# Patient Record
Sex: FEMALE
[De-identification: names, ages, dates, MRNs, and addresses within clinical notes are randomized per-mention and may not be internally consistent; named-entity substitution may affect disease eponyms.]

---

## 2018-12-03 ENCOUNTER — HOSPITAL ENCOUNTER (OUTPATIENT)
Dept: HOSPITAL 92 - BICULT | Age: 38
Discharge: HOME | End: 2018-12-03
Attending: OBSTETRICS & GYNECOLOGY
Payer: COMMERCIAL

## 2018-12-03 DIAGNOSIS — N63.11: Primary | ICD-10-CM

## 2018-12-03 NOTE — ULT
LIMITED RIGHT BREAST ULTRASOUND:

 

Date: 12-3-18

 

Provided Clinical History: 

Right breast mass. 

 

FINDINGS: 

Limited sonographic interrogation was performed of the right breast in the region of mammographic con
cern. There is a 1.1 cm oval, circumscribed hypoechoic mass present at the 12 o'clock location of the
 right breast. There is no shadowing. 

 

IMPRESSION: 

BIRADS category 4 - suspicious abnormality. Ultrasound guided biopsy should be considered. Results an
d recommendations were discussed with the patient and questions answered.

 

POS: OFF

## 2018-12-11 ENCOUNTER — HOSPITAL ENCOUNTER (OUTPATIENT)
Dept: HOSPITAL 92 - BICULT | Age: 38
End: 2018-12-11
Attending: OBSTETRICS & GYNECOLOGY
Payer: COMMERCIAL

## 2018-12-11 DIAGNOSIS — N63.10: Primary | ICD-10-CM

## 2018-12-11 PROCEDURE — 0HBT3ZX EXCISION OF RIGHT BREAST, PERCUTANEOUS APPROACH, DIAGNOSTIC: ICD-10-PCS | Performed by: RADIOLOGY

## 2018-12-11 PROCEDURE — 88305 TISSUE EXAM BY PATHOLOGIST: CPT

## 2018-12-11 PROCEDURE — 19083 BX BREAST 1ST LESION US IMAG: CPT

## 2018-12-11 NOTE — ULT
SONOGRAPHIC GUIDED BIOPSY RIGHT BREAST MASS

12/11/18

 

HISTORY: 

Solid right breast mass. 

 

FINDINGS:  

After explaining the procedure and answering all questions, the hypoechoic mass at the 12 o'clock pos
ition right breast was again visualized. Sterile technique, buffered local anesthesia, sonographic gu
idance, and an inferior approach were used to carefully advance a 14 gauge biopsy needle to the level
 of the mass. Position is confirmed. Two core biopsy specimens were obtained without difficulty and s
ubmitted to pathology for evaluation. Localization clips placed in the biopsy bed under sonographic c
ontrol. Patient tolerated the procedure well and was eventually discharged in good condition. 

 

IMPRESSION:  

Technically successful sonographic guided biopsy right breast mass. Pathology is pending. 

 

POS: JOAN

## 2020-03-12 ENCOUNTER — HOSPITAL ENCOUNTER (OUTPATIENT)
Dept: HOSPITAL 92 - BICMAMMO | Age: 40
Discharge: HOME | End: 2020-03-12
Attending: OBSTETRICS & GYNECOLOGY
Payer: COMMERCIAL

## 2020-03-12 DIAGNOSIS — Z91.89: ICD-10-CM

## 2020-03-12 DIAGNOSIS — Z12.31: Primary | ICD-10-CM

## 2020-03-12 PROCEDURE — 77063 BREAST TOMOSYNTHESIS BI: CPT

## 2020-03-12 PROCEDURE — 77067 SCR MAMMO BI INCL CAD: CPT

## 2020-03-12 NOTE — MMO
Bilateral MAMMO Bilat Screen [facility].

 

CLINICAL HISTORY:

Patient is 39 years old and is seen for screening. The patient has no family

history of breast cancer.  The patient has no personal history of cancer. The

patient has a history of Excisional Biopsy in 2018 - benign.

 

VIEWS:

The views performed were:  bilateral craniocaudal with tomosynthesis and

bilateral mediolateral oblique with tomosynthesis.

 

FILMS COMPARED:

The present examination has been compared to a prior imaging study performed at

[facility] on 11/16/2018.

 

This study has been interpreted with the assistance of computer-aided detection.

 

MAMMOGRAM FINDINGS:

The breasts are heterogeneously dense, which could obscure a lesion on

mammography.

 

There is a stable oval mass with associated biopsy clip seen in the right breast.

 

There are no suspicious masses, suspicious calcifications, or new areas of

architectural distortion.

 

IMPRESSION:

THERE IS NO MAMMOGRAPHIC EVIDENCE OF MALIGNANCY.

 

A ROUTINE FOLLOW-UP MAMMOGRAM IN 1 YEAR IS RECOMMENDED.

 

THE RESULTS OF THIS EXAM WERE SENT TO THE PATIENT.

 

ACR BI-RADS Category 2 - Benign finding

 

MAMMOGRAPHY NOTE:

 1. A negative mammogram report should not delay a biopsy if a dominant of

 clinically suspicious mass is present.

 2. Approximately 10% to 15% of breast cancers are not detected by

 mammography.

 3. Adenosis and dense breasts may obscure an underlying neoplasm.

 

 

Reported by: CANDIDA FLORES MD

Electonically Signed: [id number]

## 2021-03-17 ENCOUNTER — HOSPITAL ENCOUNTER (OUTPATIENT)
Dept: HOSPITAL 92 - BICMAMMO | Age: 41
Discharge: HOME | End: 2021-03-17
Attending: OBSTETRICS & GYNECOLOGY
Payer: COMMERCIAL

## 2021-03-17 DIAGNOSIS — Z12.31: Primary | ICD-10-CM

## 2021-03-17 DIAGNOSIS — Z91.89: ICD-10-CM

## 2021-03-17 PROCEDURE — 77063 BREAST TOMOSYNTHESIS BI: CPT

## 2021-03-17 PROCEDURE — 77067 SCR MAMMO BI INCL CAD: CPT

## 2023-04-04 ENCOUNTER — HOSPITAL ENCOUNTER (OUTPATIENT)
Dept: HOSPITAL 92 - CSHMAMMO | Age: 43
Discharge: HOME | End: 2023-04-04
Attending: OBSTETRICS & GYNECOLOGY
Payer: COMMERCIAL

## 2023-04-04 DIAGNOSIS — Z12.31: Primary | ICD-10-CM

## 2023-04-04 DIAGNOSIS — Z91.89: ICD-10-CM

## 2023-04-04 PROCEDURE — 77063 BREAST TOMOSYNTHESIS BI: CPT

## 2023-04-04 PROCEDURE — 77067 SCR MAMMO BI INCL CAD: CPT
